# Patient Record
Sex: MALE | Race: OTHER | URBAN - METROPOLITAN AREA
[De-identification: names, ages, dates, MRNs, and addresses within clinical notes are randomized per-mention and may not be internally consistent; named-entity substitution may affect disease eponyms.]

---

## 2020-12-22 ENCOUNTER — OFFICE VISIT (OUTPATIENT)
Dept: PHYSICAL MEDICINE AND REHAB | Age: 60
End: 2020-12-22
Payer: COMMERCIAL

## 2020-12-22 VITALS — TEMPERATURE: 97 F

## 2020-12-22 PROCEDURE — 95886 MUSC TEST DONE W/N TEST COMP: CPT | Performed by: PHYSICAL MEDICINE & REHABILITATION

## 2020-12-22 PROCEDURE — 95911 NRV CNDJ TEST 9-10 STUDIES: CPT | Performed by: PHYSICAL MEDICINE & REHABILITATION

## 2020-12-22 NOTE — PROGRESS NOTES
Spontaneous  Activity Volutional  MUAP's Insertional Activity Spontaneous  Activity Volutional  MUAP's   Lumbar paraspinals Increased ++ Polys Increased + Polys   Glut Med Normal None Normal Normal None Normal   Rect fem Normal None Normal Normal None Normal   Vast Med Normal None Normal Normal None Normal   Ant Tibialis Normal None Dec #, Larger Normal None Dec #, Larger   EHL Normal None \" Normal None \"   FDL Normal None \" Normal None \"   Gastroc Increased Occ Polys Increased Occ Polys   EDB Increased + Dec #, Larger Increased ++ Dec #, Larger   1st dors int Increased +++ Polys Increased Occ Polys       FINDINGS:   EMG of the lumbar paraspinals and both lower limbs revealed mild paraspinal muscle membrane irritability significant positive waves and fibrillations were noted in a distal pattern in both lower limbs (mildly asymmetric). In those irritable muscles, a diminished number of larger motor units were noted, again more significant distally. The left peroneal motor latency and evoked amplitude were acceptable but the conduction velocity was slowed. The left tibial motor latency was delayed, the evoked amplitude was diminished and the conduction velocity was slowed. I was unable to record any sensory responses from either lower limb. The tibial H reflexes were missing. IMPRESSION:      1. Abnormal EMG. 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 His endocrine disease would be a significant risk factor for this type of neuropathy. He also states he is exposed to some solvents in the work setting. 2.  The paraspinal muscle membrane irritability could be explained by #1 above. However, I cannot exclude the possibility of additional spinal nerve root injury involving S1 nerve roots. Correlation with lumbar imaging could help clarify this issue.   Overall, the study is most convincing for a generalized neuropathy. 3.  No evidence of a concurrent lumbar plexopathy or primary muscle disease.           Thank you for this interesting referral.

## 2024-12-28 ENCOUNTER — HOSPITAL ENCOUNTER (EMERGENCY)
Age: 64
Discharge: HOME | End: 2024-12-28
Attending: EMERGENCY MEDICINE
Payer: COMMERCIAL

## 2024-12-28 VITALS
SYSTOLIC BLOOD PRESSURE: 156 MMHG | RESPIRATION RATE: 18 BRPM | OXYGEN SATURATION: 96 % | HEART RATE: 89 BPM | WEIGHT: 193.5 LBS | TEMPERATURE: 98.1 F | DIASTOLIC BLOOD PRESSURE: 98 MMHG

## 2024-12-28 DIAGNOSIS — R04.0 EPISTAXIS: Primary | ICD-10-CM

## 2024-12-28 PROCEDURE — 99283 EMERGENCY DEPT VISIT LOW MDM: CPT

## 2024-12-28 PROCEDURE — 6370000000 HC RX 637 (ALT 250 FOR IP): Performed by: EMERGENCY MEDICINE

## 2024-12-28 RX ORDER — METFORMIN HYDROCHLORIDE 500 MG/1
1 TABLET, EXTENDED RELEASE ORAL 2 TIMES DAILY
COMMUNITY

## 2024-12-28 RX ORDER — PRAVASTATIN SODIUM 40 MG
1 TABLET ORAL DAILY
COMMUNITY

## 2024-12-28 RX ORDER — OXYMETAZOLINE HYDROCHLORIDE 0.05 G/100ML
2 SPRAY NASAL ONCE
Status: COMPLETED | OUTPATIENT
Start: 2024-12-28 | End: 2024-12-28

## 2024-12-28 RX ORDER — BUPROPION HYDROCHLORIDE 300 MG/1
TABLET ORAL
COMMUNITY

## 2024-12-28 RX ORDER — ALPRAZOLAM 1 MG/1
3 TABLET, EXTENDED RELEASE ORAL
COMMUNITY

## 2024-12-28 RX ADMIN — OXYMETAZOLINE HCL 2 SPRAY: 0.05 SPRAY NASAL at 23:05

## 2024-12-28 ASSESSMENT — ENCOUNTER SYMPTOMS
RESPIRATORY NEGATIVE: 1
GASTROINTESTINAL NEGATIVE: 1
EYES NEGATIVE: 1

## 2024-12-28 ASSESSMENT — PAIN - FUNCTIONAL ASSESSMENT: PAIN_FUNCTIONAL_ASSESSMENT: NONE - DENIES PAIN

## 2024-12-29 NOTE — ED PROVIDER NOTES
The history is provided by the patient.   Epistaxis  Location:  R nare  Severity:  Moderate  Duration:  1 day  Timing:  Constant  Progression:  Waxing and waning  Chronicity:  New  Context comment:  Patient has dry nose and he hashad nosebled all day of various intensities  Relieved by:  Nothing  Worsened by:  Nothing  Ineffective treatments:  None tried  Associated symptoms: congestion        Review of Systems   Constitutional: Negative.    HENT:  Positive for congestion and nosebleeds.    Eyes: Negative.    Respiratory: Negative.     Cardiovascular: Negative.    Gastrointestinal: Negative.    Genitourinary: Negative.    Musculoskeletal: Negative.    Skin: Negative.    Neurological: Negative.    All other systems reviewed and are negative.      History reviewed. No pertinent family history.  Social History     Socioeconomic History    Marital status: Unknown     Spouse name: Not on file    Number of children: Not on file    Years of education: Not on file    Highest education level: Not on file   Occupational History    Not on file   Tobacco Use    Smoking status: Never    Smokeless tobacco: Never   Vaping Use    Vaping status: Not on file   Substance and Sexual Activity    Alcohol use: Never    Drug use: Never    Sexual activity: Not on file   Other Topics Concern    Not on file   Social History Narrative    Not on file     Social Determinants of Health     Financial Resource Strain: Not on file   Food Insecurity: Not on file   Transportation Needs: Not on file   Physical Activity: Not on file   Stress: Not on file   Social Connections: Not on file   Intimate Partner Violence: Not on file   Housing Stability: Not on file     Past Surgical History:   Procedure Laterality Date    CATARACT EXTRACTION, BILATERAL Bilateral      Past Medical History:   Diagnosis Date    Diabetes mellitus (HCC)     Hyperlipidemia      Allergies   Allergen Reactions    Erythromycin Nausea Only     Prior to Admission medications

## 2024-12-29 NOTE — DISCHARGE INSTR - COC
Continuity of Care Form    Patient Name: Chris Ortiz   :  1960  MRN:  [id number]    Admit date:  2024  Discharge date:  ***    Code Status Order: No Order   Advance Directives:   Advance Care Flowsheet Documentation             Admitting Physician:  No admitting provider for patient encounter.  PCP: Joe Monroe MD    Discharging Nurse: ***  Discharging Hospital Unit/Room#: ED-10/E10  Discharging Unit Phone Number: ***    Emergency Contact:   Extended Emergency Contact Information  Primary Emergency Contact: alva ortiz  Home Phone: [phone]  Relation: Spouse  Preferred language: English   needed? No    Past Surgical History:  Past Surgical History:   Procedure Laterality Date    CATARACT EXTRACTION, BILATERAL Bilateral        Immunization History:     There is no immunization history on file for this patient.    Active Problems:  There is no problem list on file for this patient.      Isolation/Infection:   Isolation            No Isolation          Patient Infection Status       None to display            Nurse Assessment:  Last Vital Signs: BP (!) 156/98   Pulse 89   Temp 98.1 °F (36.7 °C)   Resp 18   Wt 87.8 kg (193 lb 8 oz)   SpO2 96%     Last documented pain score (0-10 scale):    Last Weight:   Wt Readings from Last 1 Encounters:   24 87.8 kg (193 lb 8 oz)     Mental Status:  {IP PT MENTAL STATUS:72980}    IV Access:  { BRETT IV ACCESS:627369670}    Nursing Mobility/ADLs:  Walking   {CHP DME ADLs:383649506}  Transfer  {CHP DME ADLs:449912121}  Bathing  {CHP DME ADLs:714588927}  Dressing  {CHP DME ADLs:142117590}  Toileting  {CHP DME ADLs:600550813}  Feeding  {CHP DME ADLs:649119123}  Med Admin  {CHP DME ADLs:575773263}  Med Delivery   { BRETT MED Delivery:302280655}    Wound Care Documentation and Therapy:        Elimination:  Continence:   Bowel: {YES / NO:}  Bladder: {YES / NO:}  Urinary Catheter: {Urinary Catheter:998842106}   Colostomy/Ileostomy/Ileal